# Patient Record
Sex: MALE | Race: OTHER | HISPANIC OR LATINO | URBAN - METROPOLITAN AREA
[De-identification: names, ages, dates, MRNs, and addresses within clinical notes are randomized per-mention and may not be internally consistent; named-entity substitution may affect disease eponyms.]

---

## 2018-11-26 ENCOUNTER — OUTPATIENT (OUTPATIENT)
Dept: OUTPATIENT SERVICES | Facility: HOSPITAL | Age: 63
LOS: 1 days | End: 2018-11-26

## 2018-11-26 ENCOUNTER — TRANSCRIPTION ENCOUNTER (OUTPATIENT)
Age: 63
End: 2018-11-26

## 2018-11-26 DIAGNOSIS — C67.4 MALIGNANT NEOPLASM OF POSTERIOR WALL OF BLADDER: ICD-10-CM

## 2018-11-26 DIAGNOSIS — Z01.818 ENCOUNTER FOR OTHER PREPROCEDURAL EXAMINATION: ICD-10-CM

## 2018-11-27 ENCOUNTER — OUTPATIENT (OUTPATIENT)
Dept: OUTPATIENT SERVICES | Facility: HOSPITAL | Age: 63
LOS: 1 days | End: 2018-11-27
Payer: MEDICARE

## 2018-11-27 ENCOUNTER — RESULT REVIEW (OUTPATIENT)
Age: 63
End: 2018-11-27

## 2018-11-27 VITALS
TEMPERATURE: 97 F | SYSTOLIC BLOOD PRESSURE: 101 MMHG | RESPIRATION RATE: 18 BRPM | DIASTOLIC BLOOD PRESSURE: 67 MMHG | OXYGEN SATURATION: 98 % | HEART RATE: 62 BPM

## 2018-11-27 VITALS
HEIGHT: 70 IN | HEART RATE: 71 BPM | WEIGHT: 223.99 LBS | TEMPERATURE: 97 F | SYSTOLIC BLOOD PRESSURE: 117 MMHG | DIASTOLIC BLOOD PRESSURE: 74 MMHG | OXYGEN SATURATION: 99 % | RESPIRATION RATE: 18 BRPM

## 2018-11-27 DIAGNOSIS — Z01.818 ENCOUNTER FOR OTHER PREPROCEDURAL EXAMINATION: ICD-10-CM

## 2018-11-27 DIAGNOSIS — C67.4 MALIGNANT NEOPLASM OF POSTERIOR WALL OF BLADDER: ICD-10-CM

## 2018-11-27 PROCEDURE — 52601 PROSTATECTOMY (TURP): CPT

## 2018-11-27 PROCEDURE — 88305 TISSUE EXAM BY PATHOLOGIST: CPT | Mod: 26

## 2018-11-27 PROCEDURE — 88307 TISSUE EXAM BY PATHOLOGIST: CPT

## 2018-11-27 PROCEDURE — 52235 CYSTOSCOPY AND TREATMENT: CPT

## 2018-11-27 PROCEDURE — 88305 TISSUE EXAM BY PATHOLOGIST: CPT

## 2018-11-27 PROCEDURE — 88307 TISSUE EXAM BY PATHOLOGIST: CPT | Mod: 26

## 2018-11-27 RX ORDER — AMLODIPINE BESYLATE 2.5 MG/1
1 TABLET ORAL
Qty: 0 | Refills: 0 | COMMUNITY

## 2018-11-27 RX ORDER — SODIUM CHLORIDE 9 MG/ML
3 INJECTION INTRAMUSCULAR; INTRAVENOUS; SUBCUTANEOUS EVERY 8 HOURS
Qty: 0 | Refills: 0 | Status: DISCONTINUED | OUTPATIENT
Start: 2018-11-27 | End: 2018-11-27

## 2018-11-27 RX ORDER — ONDANSETRON 8 MG/1
4 TABLET, FILM COATED ORAL EVERY 6 HOURS
Qty: 0 | Refills: 0 | Status: DISCONTINUED | OUTPATIENT
Start: 2018-11-27 | End: 2018-12-05

## 2018-11-27 RX ORDER — PHENAZOPYRIDINE HCL 100 MG
1 TABLET ORAL
Qty: 9 | Refills: 0 | OUTPATIENT
Start: 2018-11-27 | End: 2018-11-29

## 2018-11-27 RX ORDER — OXYCODONE AND ACETAMINOPHEN 5; 325 MG/1; MG/1
1 TABLET ORAL EVERY 4 HOURS
Qty: 0 | Refills: 0 | Status: DISCONTINUED | OUTPATIENT
Start: 2018-11-27 | End: 2018-11-27

## 2018-11-27 RX ORDER — MOXIFLOXACIN HYDROCHLORIDE TABLETS, 400 MG 400 MG/1
1 TABLET, FILM COATED ORAL
Qty: 10 | Refills: 0 | OUTPATIENT
Start: 2018-11-27 | End: 2018-12-01

## 2018-11-27 RX ORDER — LOSARTAN POTASSIUM 100 MG/1
1 TABLET, FILM COATED ORAL
Qty: 0 | Refills: 0 | COMMUNITY

## 2018-11-27 NOTE — BRIEF OPERATIVE NOTE - POST-OP DX
Bladder mass  11/27/2018    Active  Lamonte Hall  BPH with obstruction/lower urinary tract symptoms  11/27/2018  Prominent median lobe with friable tissue requiring resection  Active  Lamonte Hall

## 2018-11-27 NOTE — ASU DISCHARGE PLAN (ADULT/PEDIATRIC). - COMMENTS
Please follow up with Dr. Hall in the office on Sunday December 2nd for removal of the urinary catheter. Please call and make an appointment. If the catheter becomes bloody & stops draining urine, return to the emergency room immediately if you can not be seen in the office by Dr. Slater.

## 2018-11-27 NOTE — BRIEF OPERATIVE NOTE - PROCEDURE
<<-----Click on this checkbox to enter Procedure TURBT, using monopolar cautery  11/27/2018    Active  TABATHA  TURP (transurethral resection of prostate)  11/27/2018  Median lobe resection  Active  TABATHA

## 2018-11-27 NOTE — H&P PST ADULT - PRO ARRIVE FROM
home (0) Performs both tasks correctly (1) Performs one task correctly (1) Performs one task correctly (0) Performs both tasks correctly (0) Performs both tasks correctly

## 2018-11-27 NOTE — ASU DISCHARGE PLAN (ADULT/PEDIATRIC). - MEDICATION SUMMARY - MEDICATIONS TO TAKE
I will START or STAY ON the medications listed below when I get home from the hospital:    losartan 25 mg oral tablet  -- 1 tab(s) by mouth once a day  -- Indication: For As previously prescribed    amLODIPine 5 mg oral tablet  -- 1 tab(s) by mouth once a day  -- Indication: For As previously prescribed    Pyridium 200 mg oral tablet  -- 1 tab(s) by mouth 3 times a day (after meals)   -- May discolor urine or feces.  Medication should be taken with plenty of water.  Take with food or milk.    -- Indication: For s/p TURP    Cipro 500 mg oral tablet  -- 1 tab(s) by mouth every 12 hours   -- Avoid prolonged or excessive exposure to direct and/or artificial sunlight while taking this medication.  Check with your doctor before becoming pregnant.  Do not take dairy products, antacids, or iron preparations within one hour of this medication.  Finish all this medication unless otherwise directed by prescriber.  Medication should be taken with plenty of water.    -- Indication: For s/p TURP

## 2018-11-27 NOTE — H&P PST ADULT - HISTORY OF PRESENT ILLNESS
62 y/o male with PMHx of bladder mass & HTN who is here today for an elective TURBT for a bladder mass. Patient had a cough two weeks ago and saw his PMD who told him it was viral and the cough has thus ceased. He denies all other complaints currently including CP, SOB, cough, headaches, lightheadedness, dizziness, changes in vision, abdominal pain, nausea, vomiting, fevers, chills, muscle aches, & numbness or tingling of any extremity.

## 2018-11-27 NOTE — ASU DISCHARGE PLAN (ADULT/PEDIATRIC). - NOTIFY
Bleeding that does not stop/Numbness, color, or temperature change to extremity/Unable to Urinate/Swelling that continues/Pain not relieved by Medications/Persistent Nausea and Vomiting/Fever greater than 101/Inability to Tolerate Liquids or Foods

## 2020-11-12 NOTE — ASU PREOP CHECKLIST - PATIENT SENT TO
Procedure To Be Performed At Next Visit: PDT Blue Light
Detail Level: Detailed
Introduction Text (Please End With A Colon): The following procedure was deferred: blue light on face and scalp
operating room

## 2021-09-07 ENCOUNTER — TRANSCRIPTION ENCOUNTER (OUTPATIENT)
Age: 66
End: 2021-09-07

## 2025-05-14 NOTE — H&P PST ADULT - PSYCHIATRIC
Patient : Brenden Clark Age: 62 [facility] [place] at [address], [place], [place]; Dr Keven Nunez    Sean Roth    Total knee arthroplasty Left 10/[id number]   Signed on Workstation ID: [id number]              Lee Perez MD  [facility] [address] [address]  [phone]    [phone]     negative

## 2025-07-05 ENCOUNTER — OUTPATIENT (OUTPATIENT)
Dept: OUTPATIENT SERVICES | Facility: HOSPITAL | Age: 70
LOS: 1 days | End: 2025-07-05
Payer: MEDICARE

## 2025-07-05 ENCOUNTER — APPOINTMENT (OUTPATIENT)
Dept: MRI IMAGING | Facility: CLINIC | Age: 70
End: 2025-07-05

## 2025-07-05 DIAGNOSIS — Z00.8 ENCOUNTER FOR OTHER GENERAL EXAMINATION: ICD-10-CM

## 2025-07-05 PROCEDURE — A9585: CPT

## 2025-07-05 PROCEDURE — 76498P: CUSTOM | Mod: 26

## 2025-07-05 PROCEDURE — 72197 MRI PELVIS W/O & W/DYE: CPT

## 2025-07-05 PROCEDURE — 72197 MRI PELVIS W/O & W/DYE: CPT | Mod: 26

## 2025-07-05 PROCEDURE — 76498 UNLISTED MR PROCEDURE: CPT
